# Patient Record
Sex: FEMALE | Race: WHITE | URBAN - METROPOLITAN AREA
[De-identification: names, ages, dates, MRNs, and addresses within clinical notes are randomized per-mention and may not be internally consistent; named-entity substitution may affect disease eponyms.]

---

## 2017-01-04 ENCOUNTER — DOCTOR'S OFFICE (OUTPATIENT)
Dept: URBAN - METROPOLITAN AREA CLINIC 125 | Facility: CLINIC | Age: 57
Setting detail: OPHTHALMOLOGY
End: 2017-01-04
Payer: COMMERCIAL

## 2017-01-04 ENCOUNTER — RX ONLY (RX ONLY)
Age: 57
End: 2017-01-04

## 2017-01-04 DIAGNOSIS — S05.01XD: ICD-10-CM

## 2017-01-04 DIAGNOSIS — H16.041: ICD-10-CM

## 2017-01-04 PROCEDURE — 92012 INTRM OPH EXAM EST PATIENT: CPT | Performed by: OPTOMETRIST

## 2017-01-04 ASSESSMENT — CORNEAL SURGICAL SCARRING: OD_SCARRING: PERIPHERAL ANTERIOR STROMAL

## 2017-01-04 ASSESSMENT — LID EXAM ASSESSMENTS: OD_COMMENTS: NO FB FOUND ON LID EVERSION

## 2017-01-04 ASSESSMENT — CONFRONTATIONAL VISUAL FIELD TEST (CVF)
OD_FINDINGS: FULL
OS_FINDINGS: FULL

## 2017-01-05 ENCOUNTER — RX ONLY (RX ONLY)
Age: 57
End: 2017-01-05

## 2017-01-05 ENCOUNTER — DOCTOR'S OFFICE (OUTPATIENT)
Dept: URBAN - METROPOLITAN AREA CLINIC 125 | Facility: CLINIC | Age: 57
Setting detail: OPHTHALMOLOGY
End: 2017-01-05
Payer: COMMERCIAL

## 2017-01-05 DIAGNOSIS — H16.041: ICD-10-CM

## 2017-01-05 PROBLEM — S05.01XD CORNEAL ABRASION WITHOUT FB; RIGHT EYE SUBSEQUENT ENCOUNTER: Status: ACTIVE | Noted: 2017-01-04

## 2017-01-05 PROCEDURE — 92012 INTRM OPH EXAM EST PATIENT: CPT | Performed by: OPHTHALMOLOGY

## 2017-01-05 ASSESSMENT — REFRACTION_CURRENTRX
OS_OVR_VA: 20/
OD_OVR_VA: 20/
OD_OVR_VA: 20/
OS_OVR_VA: 20/
OD_OVR_VA: 20/
OS_OVR_VA: 20/
OD_OVR_VA: 20/
OS_OVR_VA: 20/

## 2017-01-05 ASSESSMENT — REFRACTION_MANIFEST
OS_VA3: 20/
OS_VA3: 20/
OS_VA2: 20/
OD_VA2: 20/
OU_VA: 20/
OS_VA3: 20/
OS_VA2: 20/
OD_VA2: 20/
OD_VA2: 20/
OD_VA1: 20/
OD_VA3: 20/
OS_VA1: 20/
OD_VA3: 20/
OS_VA2: 20/
OD_VA2: 20/
OU_VA: 20/
OS_VA1: 20/
OD_VA3: 20/
OS_VA3: 20/
OU_VA: 20/
OS_VA2: 20/
OS_VA2: 20/
OD_VA3: 20/
OD_VA3: 20/
OS_VA3: 20/
OS_VA3: 20/
OU_VA: 20/
OD_VA2: 20/
OD_VA1: 20/
OD_VA2: 20/
OD_VA1: 20/
OD_VA3: 20/
OS_VA1: 20/
OD_VA1: 20/
OU_VA: 20/
OU_VA: 20/
OD_VA1: 20/
OS_VA1: 20/
OS_VA2: 20/
OD_VA1: 20/

## 2017-01-05 ASSESSMENT — VISUAL ACUITY
OS_BCVA: 20/30
OS_BCVA: 20/30-1
OD_BCVA: 20/25-2
OD_BCVA: 20/25-2

## 2017-01-05 ASSESSMENT — CONFRONTATIONAL VISUAL FIELD TEST (CVF)
OD_FINDINGS: FULL
OS_FINDINGS: FULL

## 2017-01-05 ASSESSMENT — CORNEAL SURGICAL SCARRING: OD_SCARRING: PERIPHERAL ANTERIOR STROMAL

## 2017-01-16 ENCOUNTER — DOCTOR'S OFFICE (OUTPATIENT)
Dept: URBAN - METROPOLITAN AREA CLINIC 125 | Facility: CLINIC | Age: 57
Setting detail: OPHTHALMOLOGY
End: 2017-01-16
Payer: COMMERCIAL

## 2017-01-16 DIAGNOSIS — H17.11: ICD-10-CM

## 2017-01-16 DIAGNOSIS — H16.041: ICD-10-CM

## 2017-01-16 DIAGNOSIS — H10.11: ICD-10-CM

## 2017-01-16 DIAGNOSIS — H04.561: ICD-10-CM

## 2017-01-16 PROCEDURE — 92012 INTRM OPH EXAM EST PATIENT: CPT | Performed by: OPHTHALMOLOGY

## 2017-01-16 ASSESSMENT — REFRACTION_MANIFEST
OS_VA2: 20/
OD_VA1: 20/
OS_VA3: 20/
OD_VA2: 20/
OS_VA2: 20/
OS_VA3: 20/
OS_VA1: 20/
OD_VA3: 20/
OD_VA3: 20/
OS_VA1: 20/
OS_VA3: 20/
OD_VA1: 20/
OD_VA2: 20/
OD_VA2: 20/
OU_VA: 20/
OD_VA1: 20/
OU_VA: 20/
OU_VA: 20/
OD_VA3: 20/
OS_VA2: 20/
OS_VA1: 20/

## 2017-01-16 ASSESSMENT — VISUAL ACUITY
OS_BCVA: 20/25
OD_BCVA: 20/25-2

## 2017-01-16 ASSESSMENT — REFRACTION_CURRENTRX
OS_OVR_VA: 20/
OD_OVR_VA: 20/

## 2017-01-16 ASSESSMENT — CORNEAL SURGICAL SCARRING: OD_SCARRING: PERIPHERAL ANTERIOR STROMAL

## 2017-01-16 ASSESSMENT — CONFRONTATIONAL VISUAL FIELD TEST (CVF)
OD_FINDINGS: FULL
OS_FINDINGS: FULL

## 2017-01-31 ENCOUNTER — DOCTOR'S OFFICE (OUTPATIENT)
Dept: URBAN - METROPOLITAN AREA CLINIC 125 | Facility: CLINIC | Age: 57
Setting detail: OPHTHALMOLOGY
End: 2017-01-31
Payer: COMMERCIAL

## 2017-01-31 DIAGNOSIS — H10.11: ICD-10-CM

## 2017-01-31 PROBLEM — H04.561 STENOSIS LACRIMAL PUNCTUM; RIGHT EYE: Status: ACTIVE | Noted: 2017-01-31

## 2017-01-31 PROBLEM — H16.041 CORNEAL ULCER MARGINAL; RIGHT EYE: Status: ACTIVE | Noted: 2017-01-31

## 2017-01-31 PROBLEM — H17.11 CORNEAL SCAR; RIGHT EYE: Status: ACTIVE | Noted: 2017-01-31

## 2017-01-31 PROCEDURE — 92012 INTRM OPH EXAM EST PATIENT: CPT | Performed by: OPHTHALMOLOGY

## 2017-01-31 ASSESSMENT — REFRACTION_MANIFEST
OD_VA3: 20/
OD_VA3: 20/
OS_VA3: 20/
OU_VA: 20/
OS_VA3: 20/
OU_VA: 20/
OU_VA: 20/
OD_VA1: 20/
OS_VA1: 20/
OD_VA2: 20/
OS_VA2: 20/
OD_VA3: 20/
OS_VA2: 20/
OD_VA2: 20/
OS_VA1: 20/
OD_VA1: 20/
OS_VA1: 20/
OS_VA3: 20/
OD_VA2: 20/
OS_VA2: 20/
OD_VA1: 20/

## 2017-01-31 ASSESSMENT — REFRACTION_CURRENTRX
OS_OVR_VA: 20/
OD_OVR_VA: 20/
OD_OVR_VA: 20/
OS_OVR_VA: 20/
OD_OVR_VA: 20/
OS_OVR_VA: 20/

## 2017-01-31 ASSESSMENT — VISUAL ACUITY
OS_BCVA: 20/20-1
OD_BCVA: 20/25-2

## 2017-05-01 ENCOUNTER — OPTICAL OFFICE (OUTPATIENT)
Dept: URBAN - METROPOLITAN AREA CLINIC 134 | Facility: CLINIC | Age: 57
Setting detail: OPHTHALMOLOGY
End: 2017-05-01

## 2017-05-01 DIAGNOSIS — H52.03: ICD-10-CM

## 2017-05-01 PROCEDURE — S0500 DISPOS CONT LENS: HCPCS | Performed by: OPHTHALMOLOGY

## 2017-05-01 PROCEDURE — V2799 MISC VISION ITEM OR SERVICE: HCPCS | Performed by: OPHTHALMOLOGY

## 2017-08-09 ENCOUNTER — OPTICAL OFFICE (OUTPATIENT)
Dept: URBAN - METROPOLITAN AREA CLINIC 134 | Facility: CLINIC | Age: 57
Setting detail: OPHTHALMOLOGY
End: 2017-08-09

## 2017-08-09 DIAGNOSIS — H52.03: ICD-10-CM

## 2017-08-09 PROCEDURE — S0500 DISPOS CONT LENS: HCPCS | Performed by: OPHTHALMOLOGY

## 2022-04-06 ENCOUNTER — APPOINTMENT (EMERGENCY)
Dept: RADIOLOGY | Facility: HOSPITAL | Age: 62
End: 2022-04-06
Payer: COMMERCIAL

## 2022-04-06 ENCOUNTER — HOSPITAL ENCOUNTER (OUTPATIENT)
Facility: HOSPITAL | Age: 62
Setting detail: OBSERVATION
Discharge: HOME | End: 2022-04-07
Attending: EMERGENCY MEDICINE | Admitting: HOSPITALIST
Payer: COMMERCIAL

## 2022-04-06 DIAGNOSIS — R07.9 CHEST PAIN: Primary | ICD-10-CM

## 2022-04-06 PROBLEM — K21.9 GERD (GASTROESOPHAGEAL REFLUX DISEASE): Status: ACTIVE | Noted: 2022-04-06

## 2022-04-06 PROBLEM — E87.6 HYPOKALEMIA: Status: ACTIVE | Noted: 2022-04-06

## 2022-04-06 PROBLEM — I10 HTN (HYPERTENSION): Status: ACTIVE | Noted: 2022-04-06

## 2022-04-06 PROBLEM — E78.5 HLD (HYPERLIPIDEMIA): Status: ACTIVE | Noted: 2022-04-06

## 2022-04-06 LAB
2HR DELTA HS TROPONIN: <0 NG/L
ALBUMIN SERPL BCP-MCNC: 3.9 G/DL (ref 3.5–5)
ALP SERPL-CCNC: 43 U/L (ref 46–116)
ALT SERPL W P-5'-P-CCNC: 26 U/L (ref 12–78)
ANION GAP SERPL CALCULATED.3IONS-SCNC: 12 MMOL/L (ref 4–13)
AST SERPL W P-5'-P-CCNC: 26 U/L (ref 5–45)
BASOPHILS # BLD AUTO: 0.04 THOUSANDS/ΜL (ref 0–0.1)
BASOPHILS NFR BLD AUTO: 0 % (ref 0–1)
BILIRUB SERPL-MCNC: 0.33 MG/DL (ref 0.2–1)
BUN SERPL-MCNC: 15 MG/DL (ref 5–25)
CALCIUM SERPL-MCNC: 9.2 MG/DL (ref 8.3–10.1)
CARDIAC TROPONIN I PNL SERPL HS: 2 NG/L
CARDIAC TROPONIN I PNL SERPL HS: <2 NG/L
CHLORIDE SERPL-SCNC: 102 MMOL/L (ref 100–108)
CO2 SERPL-SCNC: 29 MMOL/L (ref 21–32)
CREAT SERPL-MCNC: 1 MG/DL (ref 0.6–1.3)
EOSINOPHIL # BLD AUTO: 0.1 THOUSAND/ΜL (ref 0–0.61)
EOSINOPHIL NFR BLD AUTO: 1 % (ref 0–6)
ERYTHROCYTE [DISTWIDTH] IN BLOOD BY AUTOMATED COUNT: 11.9 % (ref 11.6–15.1)
GFR SERPL CREATININE-BSD FRML MDRD: 61 ML/MIN/1.73SQ M
GLUCOSE SERPL-MCNC: 123 MG/DL (ref 65–140)
HCT VFR BLD AUTO: 39.8 % (ref 34.8–46.1)
HGB BLD-MCNC: 13.4 G/DL (ref 11.5–15.4)
IMM GRANULOCYTES # BLD AUTO: 0.04 THOUSAND/UL (ref 0–0.2)
IMM GRANULOCYTES NFR BLD AUTO: 0 % (ref 0–2)
LYMPHOCYTES # BLD AUTO: 2.05 THOUSANDS/ΜL (ref 0.6–4.47)
LYMPHOCYTES NFR BLD AUTO: 15 % (ref 14–44)
MCH RBC QN AUTO: 33.6 PG (ref 26.8–34.3)
MCHC RBC AUTO-ENTMCNC: 33.7 G/DL (ref 31.4–37.4)
MCV RBC AUTO: 100 FL (ref 82–98)
MONOCYTES # BLD AUTO: 0.29 THOUSAND/ΜL (ref 0.17–1.22)
MONOCYTES NFR BLD AUTO: 2 % (ref 4–12)
NEUTROPHILS # BLD AUTO: 10.96 THOUSANDS/ΜL (ref 1.85–7.62)
NEUTS SEG NFR BLD AUTO: 82 % (ref 43–75)
NRBC BLD AUTO-RTO: 0 /100 WBCS
PLATELET # BLD AUTO: 222 THOUSANDS/UL (ref 149–390)
PMV BLD AUTO: 10.9 FL (ref 8.9–12.7)
POTASSIUM SERPL-SCNC: 3.3 MMOL/L (ref 3.5–5.3)
PROT SERPL-MCNC: 7.6 G/DL (ref 6.4–8.2)
RBC # BLD AUTO: 3.99 MILLION/UL (ref 3.81–5.12)
SODIUM SERPL-SCNC: 143 MMOL/L (ref 136–145)
WBC # BLD AUTO: 13.48 THOUSAND/UL (ref 4.31–10.16)

## 2022-04-06 PROCEDURE — 93005 ELECTROCARDIOGRAM TRACING: CPT

## 2022-04-06 PROCEDURE — 36415 COLL VENOUS BLD VENIPUNCTURE: CPT | Performed by: EMERGENCY MEDICINE

## 2022-04-06 PROCEDURE — 71045 X-RAY EXAM CHEST 1 VIEW: CPT

## 2022-04-06 PROCEDURE — 99220 PR INITIAL OBSERVATION CARE/DAY 70 MINUTES: CPT | Performed by: HOSPITALIST

## 2022-04-06 PROCEDURE — 80053 COMPREHEN METABOLIC PANEL: CPT | Performed by: EMERGENCY MEDICINE

## 2022-04-06 PROCEDURE — 84484 ASSAY OF TROPONIN QUANT: CPT | Performed by: EMERGENCY MEDICINE

## 2022-04-06 PROCEDURE — 99285 EMERGENCY DEPT VISIT HI MDM: CPT

## 2022-04-06 PROCEDURE — 99285 EMERGENCY DEPT VISIT HI MDM: CPT | Performed by: EMERGENCY MEDICINE

## 2022-04-06 PROCEDURE — 85025 COMPLETE CBC W/AUTO DIFF WBC: CPT | Performed by: EMERGENCY MEDICINE

## 2022-04-06 PROCEDURE — 84484 ASSAY OF TROPONIN QUANT: CPT | Performed by: HOSPITALIST

## 2022-04-06 RX ORDER — DOCUSATE SODIUM 100 MG/1
100 CAPSULE, LIQUID FILLED ORAL 2 TIMES DAILY
Status: DISCONTINUED | OUTPATIENT
Start: 2022-04-06 | End: 2022-04-07 | Stop reason: HOSPADM

## 2022-04-06 RX ORDER — POTASSIUM CHLORIDE 20 MEQ/1
40 TABLET, EXTENDED RELEASE ORAL ONCE
Status: COMPLETED | OUTPATIENT
Start: 2022-04-06 | End: 2022-04-06

## 2022-04-06 RX ORDER — ONDANSETRON 2 MG/ML
1 INJECTION INTRAMUSCULAR; INTRAVENOUS ONCE
Status: COMPLETED | OUTPATIENT
Start: 2022-04-06 | End: 2022-04-06

## 2022-04-06 RX ORDER — CALCIUM CARBONATE 200(500)MG
500 TABLET,CHEWABLE ORAL 3 TIMES DAILY PRN
Status: DISCONTINUED | OUTPATIENT
Start: 2022-04-06 | End: 2022-04-07 | Stop reason: HOSPADM

## 2022-04-06 RX ORDER — NEBIVOLOL 10 MG/1
10 TABLET ORAL DAILY
Status: DISCONTINUED | OUTPATIENT
Start: 2022-04-07 | End: 2022-04-06

## 2022-04-06 RX ORDER — BISOPROLOL FUMARATE 5 MG/1
20 TABLET ORAL
Status: DISCONTINUED | OUTPATIENT
Start: 2022-04-06 | End: 2022-04-07 | Stop reason: HOSPADM

## 2022-04-06 RX ORDER — TOPIRAMATE 25 MG/1
25 TABLET ORAL
Status: DISCONTINUED | OUTPATIENT
Start: 2022-04-06 | End: 2022-04-07 | Stop reason: HOSPADM

## 2022-04-06 RX ORDER — BISOPROLOL FUMARATE 5 MG/1
10 TABLET ORAL DAILY
Status: DISCONTINUED | OUTPATIENT
Start: 2022-04-07 | End: 2022-04-06

## 2022-04-06 RX ORDER — ASPIRIN 81 MG/1
324 TABLET, CHEWABLE ORAL ONCE
Status: COMPLETED | OUTPATIENT
Start: 2022-04-06 | End: 2022-04-06

## 2022-04-06 RX ORDER — ONDANSETRON 2 MG/ML
4 INJECTION INTRAMUSCULAR; INTRAVENOUS EVERY 6 HOURS PRN
Status: DISCONTINUED | OUTPATIENT
Start: 2022-04-06 | End: 2022-04-07 | Stop reason: HOSPADM

## 2022-04-06 RX ORDER — PANTOPRAZOLE SODIUM 40 MG/1
40 TABLET, DELAYED RELEASE ORAL
Status: DISCONTINUED | OUTPATIENT
Start: 2022-04-07 | End: 2022-04-07 | Stop reason: HOSPADM

## 2022-04-06 RX ADMIN — POTASSIUM CHLORIDE 40 MEQ: 1500 TABLET, EXTENDED RELEASE ORAL at 19:50

## 2022-04-06 RX ADMIN — TOPIRAMATE 25 MG: 25 TABLET, FILM COATED ORAL at 21:34

## 2022-04-06 RX ADMIN — BISOPROLOL FUMARATE 20 MG: 5 TABLET ORAL at 21:34

## 2022-04-06 RX ADMIN — ASPIRIN 81 MG CHEWABLE TABLET 324 MG: 81 TABLET CHEWABLE at 17:06

## 2022-04-06 RX ADMIN — DOCUSATE SODIUM 100 MG: 100 CAPSULE, LIQUID FILLED ORAL at 17:07

## 2022-04-06 NOTE — ASSESSMENT & PLAN NOTE
· POA; occurred while riding in a car; associated with n/v/diaphoresis    · Resolved at this time; denies chest pain/pressure with exertion  · DARRIAN 2  · Follows with OP cardiology [facility]); has had negative stress testing in the past   · Trop:negative; EKG with TWI in V2, V3 (none for comparison)   · Telemetry   · Trend trops  · Serial EKGs

## 2022-04-06 NOTE — ED PROVIDER NOTES
History  Chief Complaint   Patient presents with    Dizziness     Pt arrivew via EMS  Patient had sudden onset of chest pain and then approx  30 minutes vomited and then the chest pain went away  Pt denies any chest pain currently, but states "uneasy feeling" in her chest  Pt states that she feels "goofy" in the head  EMS administered 4mg Zofran prehospital          History provided by:  Patient   used: No    Dizziness  Associated symptoms: chest pain, nausea and vomiting    Associated symptoms: no blood in stool, no diarrhea, no headaches, no palpitations, no shortness of breath and no weakness      Patient is a 73-year-old female presenting to emergency department after episode of nausea vomiting followed by chest pressure and diaphoresis  Occurred while driving from [facility] to home in [place]   had to pull car over as she was feeling worse  Ambulance was called  History of hypertension hyperlipidemia  History of reflux but this feels different  Chest pressure is mostly gone  MDM cardiac evaluation    With abnormal EKG and patient's symptoms will need admission for observation    None       History reviewed  No pertinent past medical history  History reviewed  No pertinent surgical history  History reviewed  No pertinent family history  I have reviewed and agree with the history as documented  E-Cigarette/Vaping     E-Cigarette/Vaping Substances     Social History     Tobacco Use    Smoking status: Not on file    Smokeless tobacco: Not on file   Substance Use Topics    Alcohol use: Not on file    Drug use: Not on file       Review of Systems   Constitutional: Positive for diaphoresis  Negative for chills and fever  HENT: Negative for congestion and sore throat  Respiratory: Negative for cough, shortness of breath, wheezing and stridor  Cardiovascular: Positive for chest pain  Negative for palpitations and leg swelling     Gastrointestinal: Positive for nausea and vomiting  Negative for abdominal pain, blood in stool and diarrhea  Genitourinary: Negative for dysuria, frequency and urgency  Musculoskeletal: Negative for neck pain and neck stiffness  Skin: Negative for pallor and rash  Neurological: Positive for light-headedness  Negative for dizziness, syncope, weakness and headaches  All other systems reviewed and are negative  Physical Exam  Physical Exam  Vitals reviewed  Constitutional:       Appearance: Normal appearance  She is well-developed  HENT:      Head: Normocephalic and atraumatic  Eyes:      Extraocular Movements: Extraocular movements intact  Pupils: Pupils are equal, round, and reactive to light  Cardiovascular:      Rate and Rhythm: Normal rate and regular rhythm  Heart sounds: Normal heart sounds  Pulmonary:      Effort: Pulmonary effort is normal  No respiratory distress  Breath sounds: Normal breath sounds  Abdominal:      General: Bowel sounds are normal       Palpations: Abdomen is soft  Tenderness: There is no abdominal tenderness  Musculoskeletal:         General: No swelling or tenderness  Normal range of motion  Cervical back: Normal range of motion and neck supple  Skin:     General: Skin is warm and dry  Capillary Refill: Capillary refill takes less than 2 seconds  Neurological:      General: No focal deficit present  Mental Status: She is alert and oriented to person, place, and time           Vital Signs  ED Triage Vitals   Temperature Pulse Respirations Blood Pressure SpO2   04/06/22 1518 04/06/22 1430 04/06/22 1430 04/06/22 1430 04/06/22 1430   97 9 °F (36 6 °C) 58 18 141/64 97 %      Temp Source Heart Rate Source Patient Position - Orthostatic VS BP Location FiO2 (%)   04/06/22 1518 04/06/22 1430 04/06/22 1602 04/06/22 1602 --   Oral Monitor Sitting Right arm       Pain Score       04/06/22 1430       2           Vitals:    04/06/22 1430 04/06/22 1500 04/06/22 1602 04/06/22 1626   BP: 141/64 120/56 122/58 116/59   Pulse: 58 56 56 58   Patient Position - Orthostatic VS:   Sitting Sitting         Visual Acuity      ED Medications  Medications   enoxaparin (LOVENOX) subcutaneous injection 40 mg (has no administration in time range)   docusate sodium (COLACE) capsule 100 mg (100 mg Oral Given 4/6/22 1707)   calcium carbonate (TUMS) chewable tablet 500 mg (has no administration in time range)   ondansetron (ZOFRAN) injection 4 mg (has no administration in time range)   pantoprazole (PROTONIX) EC tablet 40 mg (has no administration in time range)   topiramate (TOPAMAX) tablet 25 mg (has no administration in time range)   bisoprolol (ZEBETA) tablet 20 mg (has no administration in time range)   potassium chloride (K-DUR,KLOR-CON) CR tablet 40 mEq (has no administration in time range)   ondansetron (FOR EMS ONLY) (ZOFRAN) 4 mg/2 mL injection 4 mg (0 mg Does not apply Given to EMS 4/6/22 1430)   aspirin chewable tablet 324 mg (324 mg Oral Given 4/6/22 1706)       Diagnostic Studies  Results Reviewed     Procedure Component Value Units Date/Time    HS Troponin I 2hr [[id number]]  (Normal) Collected: 04/06/22 1702    Lab Status: Final result Specimen: Blood from Hand, Left Updated: 04/06/22 1735     hs TnI 2hr <2 ng/L      Delta 2hr hsTnI <0 ng/L     HS Troponin I 4hr [[id number]]     Lab Status: No result Specimen: Blood     HS Troponin 0hr (reflex protocol) [[id number]]  (Normal) Collected: 04/06/22 1448    Lab Status: Final result Specimen: Blood from Arm, Right Updated: 04/06/22 1519     hs TnI 0hr 2 ng/L     Comprehensive metabolic panel [[id number]]  (Abnormal) Collected: 04/06/22 1448    Lab Status: Final result Specimen: Blood from Arm, Right Updated: 04/06/22 1518     Sodium 143 mmol/L      Potassium 3 3 mmol/L      Chloride 102 mmol/L      CO2 29 mmol/L      ANION GAP 12 mmol/L      BUN 15 mg/dL      Creatinine 1 00 mg/dL      Glucose 123 mg/dL      Calcium 9 2 mg/dL      AST 26 U/L ALT 26 U/L      Alkaline Phosphatase 43 U/L      Total Protein 7 6 g/dL      Albumin 3 9 g/dL      Total Bilirubin 0 33 mg/dL      eGFR 61 ml/min/1 73sq m     Narrative:      [place] guidelines for Chronic Kidney Disease (CKD):     Stage 1 with normal or high GFR (GFR > 90 mL/min/1 73 square meters)    Stage 2 Mild CKD (GFR = 60-89 mL/min/1 73 square meters)    Stage 3A Moderate CKD (GFR = 45-59 mL/min/1 73 square meters)    Stage 3B Moderate CKD (GFR = 30-44 mL/min/1 73 square meters)    Stage 4 Severe CKD (GFR = 15-29 mL/min/1 73 square meters)    Stage 5 End Stage CKD (GFR <15 mL/min/1 73 square meters)  Note: GFR calculation is accurate only with a steady state creatinine    CBC and differential [[id number]]  (Abnormal) Collected: 04/06/22 1448    Lab Status: Final result Specimen: Blood from Arm, Right Updated: 04/06/22 1454     WBC 13 48 Thousand/uL      RBC 3 99 Million/uL      Hemoglobin 13 4 g/dL      Hematocrit 39 8 %       fL      MCH 33 6 pg      MCHC 33 7 g/dL      RDW 11 9 %      MPV 10 9 fL      Platelets 305 Thousands/uL      nRBC 0 /100 WBCs      Neutrophils Relative 82 %      Immat GRANS % 0 %      Lymphocytes Relative 15 %      Monocytes Relative 2 %      Eosinophils Relative 1 %      Basophils Relative 0 %      Neutrophils Absolute 10 96 Thousands/µL      Immature Grans Absolute 0 04 Thousand/uL      Lymphocytes Absolute 2 05 Thousands/µL      Monocytes Absolute 0 29 Thousand/µL      Eosinophils Absolute 0 10 Thousand/µL      Basophils Absolute 0 04 Thousands/µL                  XR chest 1 view portable   Final Result by Bernadette Helton MD (04/06 1612)      No focal airspace consolidation                    Workstation performed: [id number]                    Procedures  Procedures         ED Course  ED Course as of 04/06/22 1849   Wed Apr 06, 2022   1537 ECG shows rate of 54, sinus, left ST deviation, inverted T-waves anteriorly, nonspecific ST segments, independently interpreted by me, no previous EKG                                 DARRIAN Risk Score      Most Recent Value   Age >= 72 0 Filed at: 04/06/2022 1706   Known CAD (stenosis >= 50%) 0 Filed at: 04/06/2022 1706   Recent (<=24 hrs) Service Angina 1 Filed at: 04/06/2022 1706   ST Deviation >= 0 5 mm 0 Filed at: 04/06/2022 1706   3+ CAD Risk Factors (FHx, HTN, HLP, DM, Smoker) 1 Filed at: 04/06/2022 1706   Aspirin Use Past 7 Days 0 Filed at: 04/06/2022 1706   Elevated Cardiac Markers 0 Filed at: 04/06/2022 1706   DARRIAN Risk Score (Calculated) 2 Filed at: 04/06/2022 1706                  MDM    Disposition  Final diagnoses:   Chest pain     Time reflects when diagnosis was documented in both MDM as applicable and the Disposition within this note     Time User Action Codes Description Comment    4/6/2022  3:36 PM Corrina Bence, Ara Add [R07 9] Chest pain       ED Disposition     ED Disposition Condition Date/Time Comment    Admit Stable Wed Apr 6, 2022  3:36 PM Case was discussed with Dr Sherita Owusu and the patient's admission status was agreed to be Admission Status: observation status to the service of Dr Sherita Owusu   Follow-up Information    None         There are no discharge medications for this patient  No discharge procedures on file      PDMP Review     None          ED Provider  Electronically Signed by           Anthony Ordaz MD  04/06/22 1239

## 2022-04-06 NOTE — H&P
[facility]  H&P- Doug Hdz 1960, 63 y o  female MRN: [id number]  Unit/Bed#: S -01 Encounter: [id number]  Primary Care Provider: No primary care provider on file  Date and time admitted to hospital: 4/6/2022  2:26 PM    * Chest pain  Assessment & Plan  · POA; occurred while riding in a car; associated with n/v/diaphoresis  · Resolved at this time; denies chest pain/pressure with exertion  · DARRIAN 2  · Follows with OP cardiology [facility]); has had negative stress testing in the past   · Trop:negative; EKG with TWI in V2, V3 (none for comparison)   · Telemetry   · Trend trops  · Serial EKGs    HLD (hyperlipidemia)  Assessment & Plan  · On fenofibrate QOD  Intolerant of statins     GERD (gastroesophageal reflux disease)  Assessment & Plan  · Cont PPI   · tums prn     HTN (hypertension)  Assessment & Plan  · Cont bisoprolol at night     Hypokalemia  Assessment & Plan  · Replete prn     VTE Prophylaxis: Enoxaparin (Lovenox)   Code Status: FULL   POLST: POLST form is not discussed and not completed at this time  Discussion with family:  at bedside     Anticipated Length of Stay:  Patient will be admitted on an Observation basis with an anticipated length of stay of  < 2 midnights  Justification for Hospital Stay: chest pain     Total Time for Visit, including Counseling / Coordination of Care: 70 minutes  Greater than 50% of this total time spent on direct patient counseling and coordination of care  Chief Complaint:   Chest pain with nausea    History of Present Illness:    Doug Hzd is a 63 y o  female h/o HLD, htn who presents with an episode of chest pain  Patient was riding in a car back from [place] earlier in the day and had a normal lunch  After lunch patient began to developed epigastric discomfort with associated nausea  Symptoms continued to worsen and she began developed chest pressure/pain    She asked her  to pull over and vomited soon after  She reported diaphoresis and lightheadedness during the event  Also reported bilateral hand numbness  Patient does have a history of indigestion and takes both PPI and Pepcid as needed  She did take a Pepcid as well as the Zofran when symptoms started but did did not resolve  Patient follows with an outpatient cardiologist in [facility]  She has had outpatient stress testing but cannot recall when was last performed  She denies any exertional component to her symptoms  Review of Systems:    Review of Systems   Constitutional: Positive for diaphoresis  Negative for chills and fever  HENT: Negative  Respiratory: Negative  Cardiovascular: Positive for chest pain  Negative for palpitations and leg swelling  Gastrointestinal: Positive for nausea and vomiting  Neurological: Positive for dizziness and light-headedness  All other systems reviewed and are negative  Past Medical and Surgical History:     History reviewed  No pertinent past medical history  History reviewed  No pertinent surgical history  Meds/Allergies:    Prior to Admission medications    Not on File     I have reviewed home medications using allscripts  Allergies: Allergies   Allergen Reactions    Lorabid [Loracarbef] Rash       Social History:     Marital Status: /Civil Union   Occupation:   Patient Pre-hospital Living Situation:   Patient Pre-hospital Level of Mobility:   Patient Pre-hospital Diet Restrictions:   Substance Use History:   Social History     Substance and Sexual Activity   Alcohol Use None     Social History     Tobacco Use   Smoking Status Not on file   Smokeless Tobacco Not on file     Social History     Substance and Sexual Activity   Drug Use Not on file       Family History:    History reviewed  No pertinent family history      Physical Exam:     Vitals:   Blood Pressure: 116/59 (04/06/22 1626)  Pulse: 58 (04/06/22 1626)  Temperature: 98 3 °F (36 8 °C) (04/06/22 1626)  Temp Source: Oral (04/06/22 1626)  Respirations: 14 (04/06/22 1626)  Height: 5' 6" (167 6 cm) (04/06/22 1626)  Weight - Scale: 85 1 kg (187 lb 9 6 oz) (04/06/22 1626)  SpO2: 98 % (04/06/22 1626)    Physical Exam  Vitals and nursing note reviewed  Constitutional:       General: She is not in acute distress  Appearance: Normal appearance  She is not ill-appearing  HENT:      Head: Normocephalic and atraumatic  Cardiovascular:      Rate and Rhythm: Normal rate  Heart sounds: No murmur heard  Pulmonary:      Effort: Pulmonary effort is normal       Breath sounds: Normal breath sounds  Abdominal:      General: Abdomen is flat  There is no distension  Tenderness: There is no abdominal tenderness  There is no guarding  Musculoskeletal:         General: No swelling  Cervical back: No rigidity  Right lower leg: No edema  Left lower leg: No edema  Skin:     General: Skin is warm and dry  Neurological:      General: No focal deficit present  Mental Status: She is alert and oriented to person, place, and time  Psychiatric:         Behavior: Behavior normal            Additional Data:     Lab Results: I have personally reviewed pertinent reports  Results from last 7 days   Lab Units 04/06/22  1448   WBC Thousand/uL 13 48*   HEMOGLOBIN g/dL 13 4   HEMATOCRIT % 39 8   PLATELETS Thousands/uL 222   NEUTROS PCT % 82*   LYMPHS PCT % 15   MONOS PCT % 2*   EOS PCT % 1     Results from last 7 days   Lab Units 04/06/22  1448   SODIUM mmol/L 143   POTASSIUM mmol/L 3 3*   CHLORIDE mmol/L 102   CO2 mmol/L 29   BUN mg/dL 15   CREATININE mg/dL 1 00   ANION GAP mmol/L 12   CALCIUM mg/dL 9 2   ALBUMIN g/dL 3 9   TOTAL BILIRUBIN mg/dL 0 33   ALK PHOS U/L 43*   ALT U/L 26   AST U/L 26   GLUCOSE RANDOM mg/dL 123                       Imaging: I have personally reviewed pertinent reports        XR chest 1 view portable   Final Result by Matthew Ferrari MD (04/06 1612) No focal airspace consolidation  Workstation performed: [id number]             EKG, Pathology, and Other Studies Reviewed on Admission:   · EKG: TWI in V2, V3    Allscripts / Epic Records Reviewed: Yes     ** Please Note: This note has been constructed using a voice recognition system   **

## 2022-04-07 VITALS
HEIGHT: 66 IN | TEMPERATURE: 97.8 F | HEART RATE: 50 BPM | SYSTOLIC BLOOD PRESSURE: 114 MMHG | BODY MASS INDEX: 30.15 KG/M2 | DIASTOLIC BLOOD PRESSURE: 51 MMHG | OXYGEN SATURATION: 98 % | WEIGHT: 187.6 LBS | RESPIRATION RATE: 16 BRPM

## 2022-04-07 LAB
ANION GAP SERPL CALCULATED.3IONS-SCNC: 7 MMOL/L (ref 4–13)
ATRIAL RATE: 54 BPM
BASOPHILS # BLD AUTO: 0.04 THOUSANDS/ΜL (ref 0–0.1)
BASOPHILS NFR BLD AUTO: 1 % (ref 0–1)
BUN SERPL-MCNC: 14 MG/DL (ref 5–25)
CALCIUM SERPL-MCNC: 8.6 MG/DL (ref 8.3–10.1)
CHLORIDE SERPL-SCNC: 110 MMOL/L (ref 100–108)
CO2 SERPL-SCNC: 25 MMOL/L (ref 21–32)
CREAT SERPL-MCNC: 0.95 MG/DL (ref 0.6–1.3)
EOSINOPHIL # BLD AUTO: 0.18 THOUSAND/ΜL (ref 0–0.61)
EOSINOPHIL NFR BLD AUTO: 3 % (ref 0–6)
ERYTHROCYTE [DISTWIDTH] IN BLOOD BY AUTOMATED COUNT: 11.9 % (ref 11.6–15.1)
GFR SERPL CREATININE-BSD FRML MDRD: 65 ML/MIN/1.73SQ M
GLUCOSE P FAST SERPL-MCNC: 92 MG/DL (ref 65–99)
GLUCOSE SERPL-MCNC: 92 MG/DL (ref 65–140)
HCT VFR BLD AUTO: 39.9 % (ref 34.8–46.1)
HGB BLD-MCNC: 13.1 G/DL (ref 11.5–15.4)
IMM GRANULOCYTES # BLD AUTO: 0.01 THOUSAND/UL (ref 0–0.2)
IMM GRANULOCYTES NFR BLD AUTO: 0 % (ref 0–2)
LYMPHOCYTES # BLD AUTO: 2.17 THOUSANDS/ΜL (ref 0.6–4.47)
LYMPHOCYTES NFR BLD AUTO: 36 % (ref 14–44)
MCH RBC QN AUTO: 33.2 PG (ref 26.8–34.3)
MCHC RBC AUTO-ENTMCNC: 32.8 G/DL (ref 31.4–37.4)
MCV RBC AUTO: 101 FL (ref 82–98)
MONOCYTES # BLD AUTO: 0.34 THOUSAND/ΜL (ref 0.17–1.22)
MONOCYTES NFR BLD AUTO: 6 % (ref 4–12)
NEUTROPHILS # BLD AUTO: 3.36 THOUSANDS/ΜL (ref 1.85–7.62)
NEUTS SEG NFR BLD AUTO: 54 % (ref 43–75)
NRBC BLD AUTO-RTO: 0 /100 WBCS
P AXIS: 51 DEGREES
PLATELET # BLD AUTO: 264 THOUSANDS/UL (ref 149–390)
PMV BLD AUTO: 10.2 FL (ref 8.9–12.7)
POTASSIUM SERPL-SCNC: 3.7 MMOL/L (ref 3.5–5.3)
PR INTERVAL: 196 MS
QRS AXIS: -47 DEGREES
QRSD INTERVAL: 92 MS
QT INTERVAL: 460 MS
QTC INTERVAL: 436 MS
RBC # BLD AUTO: 3.95 MILLION/UL (ref 3.81–5.12)
SODIUM SERPL-SCNC: 142 MMOL/L (ref 136–145)
T WAVE AXIS: 13 DEGREES
VENTRICULAR RATE: 54 BPM
WBC # BLD AUTO: 6.1 THOUSAND/UL (ref 4.31–10.16)

## 2022-04-07 PROCEDURE — 80048 BASIC METABOLIC PNL TOTAL CA: CPT | Performed by: HOSPITALIST

## 2022-04-07 PROCEDURE — 93010 ELECTROCARDIOGRAM REPORT: CPT | Performed by: INTERNAL MEDICINE

## 2022-04-07 PROCEDURE — 99217 PR OBSERVATION CARE DISCHARGE MANAGEMENT: CPT | Performed by: INTERNAL MEDICINE

## 2022-04-07 PROCEDURE — 85025 COMPLETE CBC W/AUTO DIFF WBC: CPT | Performed by: HOSPITALIST

## 2022-04-07 RX ORDER — PANTOPRAZOLE SODIUM 40 MG/1
40 TABLET, DELAYED RELEASE ORAL DAILY
COMMUNITY

## 2022-04-07 RX ORDER — BISOPROLOL FUMARATE 10 MG/1
20 TABLET ORAL
COMMUNITY

## 2022-04-07 RX ORDER — TOPIRAMATE 25 MG/1
TABLET ORAL
COMMUNITY

## 2022-04-07 RX ADMIN — DOCUSATE SODIUM 100 MG: 100 CAPSULE, LIQUID FILLED ORAL at 08:50

## 2022-04-07 RX ADMIN — PANTOPRAZOLE SODIUM 40 MG: 40 TABLET, DELAYED RELEASE ORAL at 05:10

## 2022-04-07 NOTE — DISCHARGE SUMMARY
Discharge Summary - [facility] Internal Medicine    Patient Information: Mohinder Choudhury 63 y o  female MRN: [id number]  Unit/Bed#: S -01 Encounter: [id number]    Discharging Physician / Practitioner: Melanie Cárdenas MD  PCP: No primary care provider on file  Admission Date: 4/6/2022  Discharge Date: 04/07/22    Disposition:     Home    Reason for Admission:  Chest pain with nausea    Discharge Diagnoses:     Principal Problem:    Chest pain  Active Problems:    GERD (gastroesophageal reflux disease)    HLD (hyperlipidemia)    HTN (hypertension)    Hypokalemia  Resolved Problems:    * No resolved hospital problems  *      Consultations During Hospital Stay:  · None    Procedures Performed:     · Chest x-ray no active disease  · Troponins x3 negative    Hospital Course:     Mohinder Choudhury is a 63 y o  female patient who originally presented to the hospital on 4/6/2022 due to epigastric discomfort, nausea, sweating and chest pain  Symptoms started soon after eating lunch  Relieved after having an episode of vomiting  This has happened to her before  She had stress test 11/2 years ago which was negative  She follows with Cardiology group  She was admitted and observed overnight  Her troponins came back negative  She remained stable and afebrile  Chest x-ray showed no active disease  Felt like her symptoms are GI related  Recommended that she follow with her PCP, possibly follow up with repeat stress test and EGD  Condition at Discharge: good     Discharge Day Visit / Exam:     Subjective:  * feels well    No chest pain  Vitals: Blood Pressure: 114/51 (04/07/22 0700)  Pulse: (!) 50 (04/07/22 0007)  Temperature: 97 8 °F (36 6 °C) (04/07/22 0700)  Temp Source: Oral (04/07/22 0700)  Respirations: 16 (04/07/22 0700)  Height: 5' 6" (167 6 cm) (04/06/22 1626)  Weight - Scale: 85 1 kg (187 lb 9 6 oz) (04/06/22 1626)  SpO2: 98 % (04/07/22 0700)  Exam:   Physical Exam     Gen -Patient comfortable   Neck- Supple  No thyromegaly or lymphadenopathy  Lungs-Clear bilaterally without any wheeze or rales   Heart S1-S2, regular rate and rhythm, no murmurs  Abdomen-soft nontender, no organomegaly  Bowel sounds present  Extremities-no cyanosi,  clubbing or edema  Skin- no rash  Neuro-nonfocal         Discharge instructions/Information to patient and family:   See after visit summary for information provided to patient and family  Provisions for Follow-Up Care:  See after visit summary for information related to follow-up care and any pertinent home health orders  Planned Readmission:  No     Discharge Statement:  I spent 35 minutes discharging the patient  This time was spent on the day of discharge  I had direct contact with the patient on the day of discharge  Greater than 50% of the total time was spent examining patient, answering all patient questions, arranging and discussing plan of care with patient as well as directly providing post-discharge instructions  Additional time then spent on discharge activities  Discharge Medications:  See after visit summary for reconciled discharge medications provided to patient and family        ** Please Note: This note has been constructed using a voice recognition system **

## 2022-04-08 LAB
ATRIAL RATE: 56 BPM
P AXIS: 29 DEGREES
PR INTERVAL: 188 MS
QRS AXIS: -39 DEGREES
QRSD INTERVAL: 92 MS
QT INTERVAL: 458 MS
QTC INTERVAL: 441 MS
T WAVE AXIS: 5 DEGREES
VENTRICULAR RATE: 56 BPM

## 2022-04-08 PROCEDURE — 93010 ELECTROCARDIOGRAM REPORT: CPT | Performed by: INTERNAL MEDICINE
